# Patient Record
Sex: MALE | Race: OTHER | NOT HISPANIC OR LATINO
[De-identification: names, ages, dates, MRNs, and addresses within clinical notes are randomized per-mention and may not be internally consistent; named-entity substitution may affect disease eponyms.]

---

## 2020-01-17 ENCOUNTER — APPOINTMENT (OUTPATIENT)
Dept: ORTHOPEDIC SURGERY | Facility: CLINIC | Age: 30
End: 2020-01-17
Payer: COMMERCIAL

## 2020-01-17 VITALS — HEIGHT: 70 IN | WEIGHT: 185 LBS | BODY MASS INDEX: 26.48 KG/M2

## 2020-01-17 PROBLEM — Z00.00 ENCOUNTER FOR PREVENTIVE HEALTH EXAMINATION: Status: ACTIVE | Noted: 2020-01-17

## 2020-01-17 PROCEDURE — 99215 OFFICE O/P EST HI 40 MIN: CPT

## 2020-01-20 NOTE — ASSESSMENT
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 He has significant weakness when examining his rotator cuff.  He also has decreased sensation in his right arm.  He will be sent for MRI of his Cervical spine and his right shoulder.  He will not return to sport until his MRIs have been reviewed.   He can take anti-inflammatories as needed for pain.  He will followup once his MRIs are completed.  He knows to call with any questions or concerns or if his symptoms acutely worsen.

## 2020-01-20 NOTE — PHYSICAL EXAM
[de-identified] : General: No acute distress, conversant, well-nourished.\par Head: Normocephalic, atraumatic\par Neck: trachea midline, FROM\par Heart: normotensive and normal rate and rhythm\par Lungs: No labored breathing\par Skin: No abrasions, no rashes, no edema\par Psych: Alert and oriented to person, place and time\par Extremities: no peripheral edema or digital cyanosis\par Gait: Normal gait. Can perform tandem gait.  \par Vascular: warm and well perfused distally, palpable distal pulses\par \par Shoulder Exam:\par No swelling, no erythema.  \par Tenderness to palpation at shoulder. \par Pain with active ROM\par \par Positive Neer's impingement sign\par Positive Hawkin's test\par Pain and weakness with Sarai's test\par Pain and weakness with shoulder ER and IR.\par \par No tenderness at bicipital groove\par Negative Speed's test\par Negative Yergson's test\par \par Negative Cross-body Adduction\par Negative Hopewell's test\par \par Sensation intact to light touch axillary, medial and lateral antebrachial cutaneous, median, radial and ulnar distributions\par +motor deltoid, biceps, triceps, EPL, FDP and IO\par palpable radial pulse, WWP distally\par \par Cervical Spine: \par Alignment normal. \par Tenderness to palpation at neck.  No step-off, no deformity.\par \par NEURO:\par Sensation \par          Left           \par C5     2/2               \par C6     2/2               \par C7     2/2               \par C8     2/2              \par T1     2/2             \par \par          Right         \par C5     1/2               \par C6     1/2               \par C7     1/2               \par C8     1/2              \par T1     1/2      \par \par Motor: \par                                                Left             \par C5 (deltoid abduction)             5/5               \par C6 (biceps flexion)                   5/5                \par C7 (triceps extension)             5/5               \par C8 (finger flexion)                     5/5               \par T1 (interosseous)                     5/5           \par \par                                                Right           \par C5 (deltoid abduction)             4/5               \par C6 (biceps flexion)                   5/5                \par C7 (triceps extension)             5/5               \par C8 (finger flexion)                     5/5               \par T1 (interosseous)                     5/5                     \par \par Sensation \par Left L2  -  2/2            \par Left L3  -  2/2\par Left L4  -  2/2\par Left L5  -  2/2\par Left S1  -  2/2\par \par Right L2  -  2/2            \par Right L3  -  2/2\par Right L4  -  2/2\par Right L5  -  2/2\par Right S1  -  2/2\par \par Motor: \par Left L2 (hip flexion)                            5/5                \par Left L3 (knee extension)                   5/5                \par Left L4 (ankle dorsiflexion)                 5/5                \par Left L5 (long toe extensor)                5/5                \par Left S1 (ankle plantar flexion)           5/5\par \par Right L2 (hip flexion)                            5/5                \par Right L3 (knee extension)                   5/5                \par Right L4 (ankle dorsiflexion)                 5/5                \par Right L5 (long toe extensor)                5/5                \par Right S1 (ankle plantar flexion)           5/5\par \par Reflexes: Normal and symmetric\par Positive Spurling’s test.  Negative Cowan’s reflex.  \par Negative clonus.  Down-going Babinski. [de-identified] : Cervical radiographs obtained today shows no fracture or dislocation.  \par \par Right shoulder obtained today shows no fracture or dislocation.

## 2020-01-20 NOTE — HISTORY OF PRESENT ILLNESS
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 He has been experiencing numbness in his right upper extremity. He has neck pain and pain in his shoulder.  He has weakness lifting his arm above his head.  The paresthesias go down to his hand.  He did physical therapy which provided minimal relief.

## 2020-01-31 ENCOUNTER — APPOINTMENT (OUTPATIENT)
Dept: ORTHOPEDIC SURGERY | Facility: CLINIC | Age: 30
End: 2020-01-31
Payer: COMMERCIAL

## 2020-01-31 DIAGNOSIS — S49.91XA UNSPECIFIED INJURY OF RIGHT SHOULDER AND UPPER ARM, INITIAL ENCOUNTER: ICD-10-CM

## 2020-01-31 PROCEDURE — 99213 OFFICE O/P EST LOW 20 MIN: CPT

## 2020-02-02 NOTE — ASSESSMENT
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  His MRI show C5-C6 and C6-C7 right sided disc herniations.  He has compression of his right C7 nerve with weakness of his right triceps compared to his left triceps. Overall, his symptoms have significantly improved compared to his first appointment on Jan 17, 2020.  His radicular pain has resolved.  His sensation has improved.  His strength and function has improved.  He will continue conservative treatment. He was given a referral for physical therapy. He will remain out of contact sports.  He will followup in 6 weeks.  He knows to call with any questions or concerns or if his symptoms acutely worsen.

## 2020-02-02 NOTE — PHYSICAL EXAM
[de-identified] : General: No acute distress, conversant, well-nourished.\par Head: Normocephalic, atraumatic\par Neck: trachea midline, FROM\par Heart: normotensive and normal rate and rhythm\par Lungs: No labored breathing\par Skin: No abrasions, no rashes, no edema\par Psych: Alert and oriented to person, place and time\par Extremities: no peripheral edema or digital cyanosis\par Gait: Normal gait. Can perform tandem gait. \par Vascular: warm and well perfused distally, palpable distal pulses\par \par MSK:\par Right Shoulder Exam:\par No swelling, no erythema. \par No tenderness to palpation at shoulder. \par No pain with active ROM\par \par Negative Neer's impingement sign\par Negative Hawkin's test\par Negative Sarai's test\par Good strength with shoulder ER and IR.\par \par No tenderness at bicipital groove\par Negative Speed's test\par Negative Yergson's test\par \par Negative Cross-body Adduction\par Negative Caddo's test\par \par Sensation intact to light touch axillary, medial and lateral antebrachial cutaneous, median, radial and ulnar distributions\par +motor deltoid, biceps, triceps, EPL, FDP and IO\par palpable radial pulse, WWP distally\par \par Cervical Spine: \par Alignment normal. \par No tenderness to palpation at neck. No step-off, no deformity.\par \par NEURO:\par Sensation \par  Left \par C5 2/2 \par C6 2/2 \par C7 2/2 \par C8 2/2 \par T1 2/2 \par \par  Right \par C5 2/2 \par C6 2/2 \par C7 2/2 \par C8 2/2 \par T1 2/2 \par \par Motor: \par    Left \par C5 (deltoid abduction) 5/5 \par C6 (biceps flexion)  5/5 \par C7 (triceps extension) 5/5 \par C8 (finger flexion)  5/5 \par T1 (interosseous)  5/5 \par \par    Right \par C5 (deltoid abduction) 4/5 \par C6 (biceps flexion)  5/5 \par C7 (triceps extension) 4+/5 \par C8 (finger flexion)  5/5 \par T1 (interosseous)  5/5  \par \par Sensation \par Left L2 - 2/2 \par Left L3 - 2/2\par Left L4 - 2/2\par Left L5 - 2/2\par Left S1 - 2/2\par \par Right L2 - 2/2 \par Right L3 - 2/2\par Right L4 - 2/2\par Right L5 - 2/2\par Right S1 - 2/2\par \par Motor: \par Left L2 (hip flexion)  5/5 \par Left L3 (knee extension)  5/5 \par Left L4 (ankle dorsiflexion)  5/5 \par Left L5 (long toe extensor) 5/5 \par Left S1 (ankle plantar flexion) 5/5\par \par Right L2 (hip flexion)  5/5 \par Right L3 (knee extension)  5/5 \par Right L4 (ankle dorsiflexion)  5/5 \par Right L5 (long toe extensor) 5/5 \par Right S1 (ankle plantar flexion) 5/5\par \par Reflexes: Normal and symmetric\par Negative Spurling’s test. Negative Cowan’s reflex. \par Negative clonus. Down-going Babinski.  [de-identified] : MRI cervical spine (1/21/20): Right C5-C6 disc herniation encroaching the exiting right C6 with moderate foraminal stenosis,  Right C6-C7 disc herniation causing compression of the exiting C7 nerve roots and severe foraminal stenosis.  \par \par MRI right shoulder (1/25/20): Small nondisplaced tear of superior labrum.  Evidence of mild adhesive capsulitis.  Mild superior rotator cuff tendinosis.  MIld AC joint arthrosis.

## 2020-02-02 NOTE — HISTORY OF PRESENT ILLNESS
[de-identified] : 29 year old male returns for followup to review his recent MRIs.  Since his last visit he says his pain has completely resolved.  He said the paresthesias have also resolved. He is not taking any medications for pain.  He says his right arm still feels weaker compared to the left, especially with elbow extension. He no longer has issues with lifting his arm above his head and overall he says his strength is improving.   He denies fine motor deficits, balance problems or urinary retention.

## 2020-03-10 ENCOUNTER — APPOINTMENT (OUTPATIENT)
Dept: ORTHOPEDIC SURGERY | Facility: CLINIC | Age: 30
End: 2020-03-10
Payer: COMMERCIAL

## 2020-03-10 DIAGNOSIS — M54.12 RADICULOPATHY, CERVICAL REGION: ICD-10-CM

## 2020-03-10 PROCEDURE — 99213 OFFICE O/P EST LOW 20 MIN: CPT

## 2020-03-10 NOTE — ASSESSMENT
[FreeTextEntry1] : 29 year old male returns for followup for right cervical radiculopathy.  He has had complete resolution of symptoms.  He has full strength.  He can return to his sport as a  and resume activities as tolerated.  He knows to look for and report any reoccurrence of symptoms.  He will followup in 6 weeks.  He knows to call with any questions or concerns or if his symptoms acutely worsen.

## 2020-03-10 NOTE — PHYSICAL EXAM
[de-identified] : General: No acute distress, conversant, well-nourished.\par Head: Normocephalic, atraumatic\par Neck: trachea midline, FROM\par Heart: normotensive and normal rate and rhythm\par Lungs: No labored breathing\par Skin: No abrasions, no rashes, no edema\par Psych: Alert and oriented to person, place and time\par Extremities: no peripheral edema or digital cyanosis\par Gait: Normal gait. Can perform tandem gait.  \par Vascular: warm and well perfused distally, palpable distal pulses\par \par MSK:\par Cervical Spine: \par No tenderness to palpation.  No step-off, no deformity.\par No pain or neurologic symptoms with full active range of motion (flexion, extension, lateral bending and rotation).\par \par NEURO:\par Sensation \par          Left           \par C5     2/2               \par C6     2/2               \par C7     2/2               \par C8     2/2              \par T1     2/2             \par \par          Right         \par C5     2/2               \par C6     2/2               \par C7     2/2               \par C8     2/2              \par T1     2/2      \par \par Motor: \par                                                Left             \par C5 (deltoid abduction)             5/5               \par C6 (biceps flexion)                   5/5                \par C7 (triceps extension)             5/5               \par C8 (finger flexion)                     5/5               \par T1 (interosseous)                     5/5           \par \par                                                Right           \par C5 (deltoid abduction)             5/5               \par C6 (biceps flexion)                   5/5                \par C7 (triceps extension)             5/5               \par C8 (finger flexion)                     5/5               \par T1 (interosseous)                     5/5                     \par \par Sensation \par Left L2  -  2/2            \par Left L3  -  2/2\par Left L4  -  2/2\par Left L5  -  2/2\par Left S1  -  2/2\par \par Right L2  -  2/2            \par Right L3  -  2/2\par Right L4  -  2/2\par Right L5  -  2/2\par Right S1  -  2/2\par \par Motor: \par Left L2 (hip flexion)                            5/5                \par Left L3 (knee extension)                   5/5                \par Left L4 (ankle dorsiflexion)                 5/5                \par Left L5 (long toe extensor)                5/5                \par Left S1 (ankle plantar flexion)           5/5\par \par Right L2 (hip flexion)                            5/5                \par Right L3 (knee extension)                   5/5                \par Right L4 (ankle dorsiflexion)                 5/5                \par Right L5 (long toe extensor)                5/5                \par Right S1 (ankle plantar flexion)           5/5\par \par Reflexes: Normal and symmetric\par Negative Spurling’s test.  Negative Cowan’s reflex.  \par Negative clonus.  Down-going Babinski.

## 2020-03-10 NOTE — HISTORY OF PRESENT ILLNESS
[de-identified] : 29 year old male returns for followup.  He says he is doing great and has complete resolution of his symptoms.  He says he feels his strength is equal between his two upper extremities.  He has no pain, no paresthesias, no numbness, no fine motor deficits, no balance problems, no bowel/bladder symptoms.  He is not taking any pain medications.

## 2020-04-21 ENCOUNTER — APPOINTMENT (OUTPATIENT)
Dept: ORTHOPEDIC SURGERY | Facility: CLINIC | Age: 30
End: 2020-04-21

## 2020-12-16 ENCOUNTER — EMERGENCY (EMERGENCY)
Facility: HOSPITAL | Age: 30
LOS: 1 days | Discharge: HOME | End: 2020-12-16
Payer: COMMERCIAL

## 2020-12-16 VITALS
HEART RATE: 58 BPM | HEIGHT: 70 IN | SYSTOLIC BLOOD PRESSURE: 129 MMHG | DIASTOLIC BLOOD PRESSURE: 67 MMHG | RESPIRATION RATE: 18 BRPM | OXYGEN SATURATION: 100 % | WEIGHT: 199.96 LBS | TEMPERATURE: 98 F

## 2020-12-16 LAB — SARS-COV-2 RNA SPEC QL NAA+PROBE: SIGNIFICANT CHANGE UP

## 2020-12-16 PROCEDURE — 99284 EMERGENCY DEPT VISIT MOD MDM: CPT

## 2020-12-16 PROCEDURE — U0003: CPT

## 2020-12-16 PROCEDURE — 99283 EMERGENCY DEPT VISIT LOW MDM: CPT

## 2020-12-16 NOTE — ED PROVIDER NOTE - PHYSICAL EXAMINATION
Const: AOX3 nontoxic appearing, no apparent respiratory or physical distress. Stable gait   HEENT: NC/AT. Moist mucous membranes.  Eyes: EOMI  Neck: Soft and supple. Full ROM without pain.  Cardiac: Regular rate and regular rhythm. +S1/S2. No murmurs.   Resp: Speaking in full sentences. No evidence of respiratory distress. No wheezes, rales or rhonchi. No adventitious breath sounds   Abd: Soft, non-tender, non-distended. Normal bowel sounds in all 4 quadrants. No guarding or rebound.  Back: Spine midline and non-tender.   Skin: No rashes, abrasions or lacerations.  Neuro: Awake, alert & oriented x 3. Moves all extremities symmetrically.

## 2020-12-16 NOTE — ED PROVIDER NOTE - NS ED ROS FT
Denies fever, chills, fatigue, and weight loss. Denies HA, Dizziness.   ENMT: Denies URI symptoms, difficulty swallowing, sore throat, loss of taste or smell.   CARDIO: Denies CP, palpitations, edema.   RESP: Denies Cough, SOB, Diff breathing, hemoptysis.   GI: Denies N/V, ABD pain, change in bowel movement..   MS: Denies joint pain, back pain, weakness, decreased ROM, swelling.   NEURO: Denies change in gait, seizures, loss of sensation, dizziness, confusion LOC.   SKIN: denies rash or discoloration

## 2020-12-16 NOTE — ED PROVIDER NOTE - PROGRESS NOTE DETAILS
COVID swabs obtained.  Advised home quarantine until test results.  If test positive, requires home quarantine for 14-days.  Anticipatory guidance provided and supportive care recommended. Advised immediate return if worsening symptoms, especially if short of breath.

## 2020-12-16 NOTE — ED PROVIDER NOTE - OBJECTIVE STATEMENT
Pt presenting to the ER for COVID-19 testing. Denies fevers chills, loss of taste or smell, URI symptoms, chest pain or shortness of breath, nausea vomiting diarrhea abdominal pain, weakness or fatigue. Eating and drinking normal diet. Normal output. Pt requesting testing at this time due to positive exposure. no travel and non-smoker.

## 2020-12-16 NOTE — ED PROVIDER NOTE - CLINICAL SUMMARY MEDICAL DECISION MAKING FREE TEXT BOX
Pt is a 30y M with no PMH presenting for covid swab s/p exposure. Pt is symptomatic. Will send swab and return precautions given.

## 2020-12-16 NOTE — ED PROVIDER NOTE - PATIENT PORTAL LINK FT
You can access the FollowMyHealth Patient Portal offered by [facility] by registering at the following website: [email or web address]. By joining [employer]’s FollowMyHealth portal, you will also be able to view your health information using other applications (apps) compatible with our system.

## 2020-12-22 ENCOUNTER — EMERGENCY (EMERGENCY)
Facility: HOSPITAL | Age: 30
LOS: 1 days | Discharge: HOME | End: 2020-12-22
Payer: COMMERCIAL

## 2020-12-22 VITALS
RESPIRATION RATE: 16 BRPM | DIASTOLIC BLOOD PRESSURE: 67 MMHG | WEIGHT: 199.96 LBS | HEIGHT: 70 IN | SYSTOLIC BLOOD PRESSURE: 128 MMHG | HEART RATE: 77 BPM | OXYGEN SATURATION: 99 % | TEMPERATURE: 98 F

## 2020-12-22 PROBLEM — Z78.9 OTHER SPECIFIED HEALTH STATUS: Chronic | Status: ACTIVE | Noted: 2020-12-16

## 2020-12-22 PROCEDURE — 99282 EMERGENCY DEPT VISIT SF MDM: CPT

## 2020-12-22 PROCEDURE — 99283 EMERGENCY DEPT VISIT LOW MDM: CPT

## 2020-12-22 PROCEDURE — U0003: CPT

## 2020-12-22 PROCEDURE — 87631 RESP VIRUS 3-5 TARGETS: CPT

## 2020-12-22 NOTE — ED PROVIDER NOTE - NSFOLLOWUPINSTRUCTIONS_ED_ALL_ED_FT
You were tested for COVID19 during your visit in the emergency department. The results will take 5-7 days to come back. Do not return to work until your COVID test results as negative. Plan to self-quarantine yourself until this result is available. Avoid contact with others. Wash your hands frequently. If you develop worsening symptoms- such as respiratory distress, confusion, severe weakness, or anything new or concerning, please return to the emergency department to be evaluated.

## 2020-12-22 NOTE — ED PROVIDER NOTE - PHYSICAL EXAMINATION
Constitutional - well-developed; well nourished. Head - NCAT. Airway patent. Neuro - A&Ox3. strength 5/5 x4. sensation intact x4. normal gait. Skin - No rash. MSK - normal ROM.

## 2020-12-22 NOTE — ED PROVIDER NOTE - PATIENT PORTAL LINK FT
You can access the FollowMyHealth Patient Portal offered by [facility] by registering at the following website: [email or web address]. By joining [employer]’s FollowMyHealth portal, you will also be able to view your health information using other applications (apps) compatible with our system.

## 2021-02-10 ENCOUNTER — EMERGENCY (EMERGENCY)
Facility: HOSPITAL | Age: 31
LOS: 1 days | Discharge: HOME | End: 2021-02-10
Payer: COMMERCIAL

## 2021-02-10 ENCOUNTER — TRANSCRIPTION ENCOUNTER (OUTPATIENT)
Age: 31
End: 2021-02-10

## 2021-02-10 VITALS
HEIGHT: 70 IN | DIASTOLIC BLOOD PRESSURE: 75 MMHG | HEART RATE: 63 BPM | TEMPERATURE: 98 F | SYSTOLIC BLOOD PRESSURE: 127 MMHG | RESPIRATION RATE: 20 BRPM | WEIGHT: 199.96 LBS | OXYGEN SATURATION: 98 %

## 2021-02-10 LAB — SARS-COV-2 RNA SPEC QL NAA+PROBE: SIGNIFICANT CHANGE UP

## 2021-02-10 PROCEDURE — 99282 EMERGENCY DEPT VISIT SF MDM: CPT

## 2021-02-10 PROCEDURE — U0003: CPT

## 2021-02-10 PROCEDURE — U0005: CPT

## 2021-02-10 PROCEDURE — 99283 EMERGENCY DEPT VISIT LOW MDM: CPT

## 2021-02-10 NOTE — ED PROVIDER NOTE - PATIENT PORTAL LINK FT
You can access the FollowMyHealth Patient Portal offered by [facility] by registering at the following website: [email or web address]. By joining [employer]’s FollowMyHealth portal, you will also be able to view your health information using other applications (apps) compatible with our system.

## 2021-02-10 NOTE — ED PROVIDER NOTE - OBJECTIVE STATEMENT
COVID ASYMPTOMATIC SWAB  Pt presenting to the ER for COVID-19 testing. Denies fevers chills, loss of taste or smell, URI symptoms, chest pain or shortness of breath, nausea vomiting diarrhea abdominal pain, weakness or fatigue. Eating and drinking normal diet. Normal output. Pt requesting testing at this time. positive exposure

## 2022-05-25 ENCOUNTER — APPOINTMENT (OUTPATIENT)
Dept: ORTHOPEDIC SURGERY | Facility: CLINIC | Age: 32
End: 2022-05-25
Payer: COMMERCIAL

## 2022-05-25 DIAGNOSIS — M23.92 UNSPECIFIED INTERNAL DERANGEMENT OF LEFT KNEE: ICD-10-CM

## 2022-05-25 DIAGNOSIS — M25.562 PAIN IN LEFT KNEE: ICD-10-CM

## 2022-05-25 PROCEDURE — 73562 X-RAY EXAM OF KNEE 3: CPT | Mod: LT

## 2022-05-25 PROCEDURE — 99214 OFFICE O/P EST MOD 30 MIN: CPT

## 2022-05-25 NOTE — ASSESSMENT
[FreeTextEntry1] : Discussed at length with patient exam history and imaging and clinical concern of her LCL injury versus lateral meniscus tear.  He was concerned over ligamentous injury MRI recommended for further evaluation and patient agrees.  Activity restrictions discussed until MRI review

## 2022-05-25 NOTE — HISTORY OF PRESENT ILLNESS
[de-identified] : Patient is a new patient who has been seen several years ago presenting for new onset of left knee pain.  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 He reports mild swelling and mild soreness with ambulation

## 2022-05-25 NOTE — PHYSICAL EXAM
[de-identified] : Left knee\par \par Constitutional: \par The patient is healthy-appearing and in no apparent distress. \par \par Gait:\par The patient ambulates with a normal gait and no limp.\par \par Cardiovascular System: \par The capillary refill is less than 2 seconds. \par \par Skin: \par There are no skin abnormalities.\par \par Left Knee:\par  \par Bony Palpation: \par There is no tenderness of the medial joint line. \par There is tenderness of the lateral joint line.\par There is no tenderness of the medial femoral chondyle.\par There is no tenderness of the lateral femoral chondyle.\par There is no tenderness of the tibial tubercle.\par There is no tenderness of the superior patella.\par There is no tenderness of the inferior patella.\par There is no tenderness of the medial patellar facet.\par There is no tenderness of the lateral patellar facet.\par \par Soft Tissue Palpation: \par There is no tenderness of the medial retinaculum.\par There is tenderness of the mid to distal LCL.\par There is no tenderness of the lateral retinaculum.\par There is no tenderness of the quadriceps tendon.\par There is no tenderness of the patella tendon.\par There is no tenderness of the ITB.\par There is no tenderness of the pes anserine.\par \par Active Range of Motion: \par The range of motion at the knee actively and passively is full. \par \par Special Tests: \par There is a negative Apley.\par There is a negative Steinmanns. \par There is a negative Lachman and Anterior Drawer.\par There is a negative Posterior Drawer.  \par There is no valgus laxity.\par There is trace grade 1 valgus laxtiy with pain.\par \par Strength: \par There is 5/5 hip flexion and 5/5 knee flexion and extension.  \par \par Psychiatric: \par The patient demonstrates a normal mood and affect and is active and alert [de-identified] : Given patient's reported history and physical examination, x-ray evaluation ( as listed below ) was ordered and performed to aid in diagnosis and treatment of the patient.\par X-ray left knee.  There is no significant bony / soft tissue abnormality, arthritis, or fracture.\par

## 2022-06-01 ENCOUNTER — TRANSCRIPTION ENCOUNTER (OUTPATIENT)
Age: 32
End: 2022-06-01

## 2024-07-01 ENCOUNTER — APPOINTMENT (OUTPATIENT)
Dept: ORTHOPEDIC SURGERY | Facility: CLINIC | Age: 34
End: 2024-07-01
Payer: MEDICAID

## 2024-07-01 PROBLEM — S29.011A STRAIN OF RIGHT PECTORALIS MUSCLE: Status: ACTIVE | Noted: 2024-07-01

## 2024-07-01 PROCEDURE — 73030 X-RAY EXAM OF SHOULDER: CPT | Mod: RT

## 2024-07-01 PROCEDURE — 99213 OFFICE O/P EST LOW 20 MIN: CPT | Mod: 25

## 2024-07-03 ENCOUNTER — APPOINTMENT (OUTPATIENT)
Dept: ORTHOPEDIC SURGERY | Facility: CLINIC | Age: 34
End: 2024-07-03

## 2024-07-17 ENCOUNTER — APPOINTMENT (OUTPATIENT)
Dept: ORTHOPEDIC SURGERY | Facility: CLINIC | Age: 34
End: 2024-07-17

## 2024-07-17 DIAGNOSIS — S29.011A STRAIN OF MUSCLE AND TENDON OF FRONT WALL OF THORAX, INITIAL ENCOUNTER: ICD-10-CM

## 2024-07-23 ENCOUNTER — NON-APPOINTMENT (OUTPATIENT)
Age: 34
End: 2024-07-23

## 2024-07-23 ENCOUNTER — APPOINTMENT (OUTPATIENT)
Dept: ORTHOPEDIC SURGERY | Facility: CLINIC | Age: 34
End: 2024-07-23
Payer: MEDICAID

## 2024-07-23 VITALS — WEIGHT: 185 LBS | BODY MASS INDEX: 26.48 KG/M2 | HEIGHT: 70 IN

## 2024-07-23 PROBLEM — S29.011A RUPTURE OF PECTORALIS MAJOR MUSCLE, INITIAL ENCOUNTER: Status: ACTIVE | Noted: 2024-07-23

## 2024-07-23 PROCEDURE — 99214 OFFICE O/P EST MOD 30 MIN: CPT

## 2024-07-23 NOTE — PHYSICAL EXAM
[de-identified] : Right Shoulder: Constitutional: The patient is healthy-appearing and in no apparent distress.   Cardiovascular System:  The capillary refill is less than 2 seconds.   Skin:  There are no skin abnormalities.  Right Shoulder:  Inspection:  There is no atrophy, erythema, warmth, swelling. There is no scapular winging. There is no AC prominence.   Bony Palpation:  There is no tenderness of the clavicle. There is no tenderness of the acromioclavicular joint. There is no tenderness of the greater tuberosity.  There is no tenderness of the bicipital groove.   Soft Tissue Palpation:  There is no tenderness of the trapezius. There is no tenderness of the rhomboid. There is no tenderness of the subacromial bursa. There is tenderness at the pec insertion without palpable defect and medial retraction of the muscle  Active Range of Motion:  Forward flexion- 				180  Abduction-					150 External rotation at 0 degrees abduction-	80  Internal rotation at 0 degrees abduction-	80  Passive Range of Motion:  Forward flexion- 			180  Abduction-				150 External rotation at 0 deg abduction-	80  Internal rotation at 0 deg abduction-	80  Stability:  There is no general laxity.  There is no anterior apprehension.  Strength:  Supraspinatus abduction 		5/5 External rotation at 0 deg abduction 	5/5 Internal rotation at 0 deg abduction	5/5 Scapular elevation 			5/5   Neurological System:   There is normal sensation to light touch C5-T1.   Stability:  There is no general laxity.   Psychiatric:  The patient demonstrates a normal mood and affect and is active and alert.  [de-identified] : MRI of right shoulder ([facility]) reveals a pectoralis muscle avulsion of the tendon of both the clavicular and sternal head with retraction

## 2024-07-23 NOTE — ASSESSMENT
[FreeTextEntry1] : Discussed with patient at length symptoms and diagnosis.  Lengthy discussion with patient regarding nonoperative and operative risks and benefits as well as surgical expectations and postop protocols and expectations, including the possibility that surgery may fail to satisfactorily resolve patient's condition / symptoms.  Patient aware of the need for 6 weeks immobilization. Patient expresses understanding and patient's questions were answered.  Patient elects to proceed with surgery.

## 2024-07-23 NOTE — HISTORY OF PRESENT ILLNESS
[de-identified] : New issue: Right pectoralis  Pain:3/10 Symptoms: Aching / Sharp Prior studies: MRI @ [facility] Duration: 6/28/2024

## 2024-07-26 ENCOUNTER — APPOINTMENT (OUTPATIENT)
Age: 34
End: 2024-07-26

## 2024-07-26 PROCEDURE — 24341 RPR TDN/MUSC UPR A/E EACH: CPT | Mod: RT

## 2024-07-26 RX ORDER — OXYCODONE AND ACETAMINOPHEN 5; 325 MG/1; MG/1
5-325 TABLET ORAL
Qty: 40 | Refills: 0 | Status: ACTIVE | COMMUNITY
Start: 2024-07-26 | End: 1900-01-01

## 2024-07-30 ENCOUNTER — APPOINTMENT (OUTPATIENT)
Dept: ORTHOPEDIC SURGERY | Facility: CLINIC | Age: 34
End: 2024-07-30
Payer: MEDICAID

## 2024-07-30 DIAGNOSIS — S29.011A STRAIN OF MUSCLE AND TENDON OF FRONT WALL OF THORAX, INITIAL ENCOUNTER: ICD-10-CM

## 2024-07-30 PROCEDURE — 99024 POSTOP FOLLOW-UP VISIT: CPT

## 2024-07-30 NOTE — HISTORY OF PRESENT ILLNESS
[de-identified] : Status post right pectoralis tendon and muscle repair [de-identified] : Patient is 4 days postop states overall he is doing rather well maintaining the sling immobilizer at all times denies any paresthesias not taking any narcotics only ibuprofen intermittently for discomfort [de-identified] : On evaluation of right upper extremity postop dressing was removed wound is clean dry and intact with staples there is palpably intact pectoralis muscle and tendon with adequate overall positioning.  Normal sensation light touch C5 T1 with full elbow and wrist range of motion and strength [de-identified] : Status post right pectoralis tendon and muscle repair [de-identified] : Reviewed at length with patient surgery postop protocols with strict restrictions for the next 6 weeks except for daily elbow range of motion exercises he will follow-up in 2 weeks for staple removal he expresses understanding and restrictions if any increased pain or concern he is to contact the office

## 2024-08-08 ENCOUNTER — APPOINTMENT (OUTPATIENT)
Dept: ORTHOPEDIC SURGERY | Facility: CLINIC | Age: 34
End: 2024-08-08

## 2024-08-08 PROCEDURE — 99024 POSTOP FOLLOW-UP VISIT: CPT

## 2024-08-12 NOTE — HISTORY OF PRESENT ILLNESS
[de-identified] : Status post right pectoralis tendon and muscle repair [de-identified] : Patient is 2.5 weeks postop states overall he is doing rather well maintaining the sling immobilizer at all times denies any paresthesias not taking any medication [de-identified] : On evaluation of right upper extremity postop dressing is healed.  Staples removed.  The pectoralis muscle and tendon is intact palpably with adequate overall positioning.  Normal sensation light touch C5 T1 with full elbow and wrist range of motion and strength [de-identified] : Status post right pectoralis tendon and muscle repair [de-identified] : Reviewed at length with patient surgery postop protocols with restrictions fora total of 6 weeks.   Follow-up in 3.5 weeks

## 2024-08-12 NOTE — HISTORY OF PRESENT ILLNESS
[de-identified] : Status post right pectoralis tendon and muscle repair [de-identified] : Patient is 2.5 weeks postop states overall he is doing rather well maintaining the sling immobilizer at all times denies any paresthesias not taking any medication [de-identified] : On evaluation of right upper extremity postop dressing is healed.  Staples removed.  The pectoralis muscle and tendon is intact palpably with adequate overall positioning.  Normal sensation light touch C5 T1 with full elbow and wrist range of motion and strength [de-identified] : Status post right pectoralis tendon and muscle repair [de-identified] : Reviewed at length with patient surgery postop protocols with restrictions fora total of 6 weeks.   Follow-up in 3.5 weeks

## 2024-09-04 ENCOUNTER — APPOINTMENT (OUTPATIENT)
Dept: ORTHOPEDIC SURGERY | Facility: CLINIC | Age: 34
End: 2024-09-04
Payer: MEDICAID

## 2024-09-04 DIAGNOSIS — S29.011A STRAIN OF MUSCLE AND TENDON OF FRONT WALL OF THORAX, INITIAL ENCOUNTER: ICD-10-CM

## 2024-09-04 PROCEDURE — 99024 POSTOP FOLLOW-UP VISIT: CPT

## 2024-09-04 NOTE — HISTORY OF PRESENT ILLNESS
[de-identified] : Status post right pectoralis tendon and muscle repair [de-identified] : Patient is 6 weeks postop states overall he is doing rather well maintaining the sling immobilizer at all times denies any paresthesias not taking any medication [de-identified] : On evaluation of right upper extremity:  WOund is healed.  The pectoralis muscle and tendon is intact palpably with adequate overall positioning.  Normal sensation light touch C5 T1 with full elbow and wrist range of motion and strength [de-identified] : Status post right pectoralis tendon and muscle repair [de-identified] : Reviewed at length with patient surgery postop protocols with restrictions fora total of 6 weeks.   DC sling and begin PT next week.  Follow-up in 3 weeks

## 2024-09-25 ENCOUNTER — APPOINTMENT (OUTPATIENT)
Dept: ORTHOPEDIC SURGERY | Facility: CLINIC | Age: 34
End: 2024-09-25
Payer: MEDICAID

## 2024-09-25 DIAGNOSIS — S29.011A STRAIN OF MUSCLE AND TENDON OF FRONT WALL OF THORAX, INITIAL ENCOUNTER: ICD-10-CM

## 2024-09-25 PROCEDURE — 99024 POSTOP FOLLOW-UP VISIT: CPT

## 2024-09-25 NOTE — HISTORY OF PRESENT ILLNESS
[de-identified] : Status post right pectoralis tendon and muscle repair [de-identified] : Patient is 9 weeks postop states overall he is doing rather well maintaining the sling immobilizer at all times denies any paresthesias not taking any medication [de-identified] : On evaluation of right upper extremity:  Wound is healed.  The pectoralis muscle and tendon is intact palpably with adequate overall positioning.  Normal sensation light touch C5 T1 with full elbow and wrist range of motion and strength [de-identified] : Status post right pectoralis tendon and muscle repair [de-identified] : Reviewed at length with patient surgery postop protocols.  Patient may gently advance activity.

## 2025-04-29 ENCOUNTER — APPOINTMENT (OUTPATIENT)
Dept: ORTHOPEDIC SURGERY | Facility: CLINIC | Age: 35
End: 2025-04-29
Payer: MEDICAID

## 2025-04-29 PROCEDURE — 99213 OFFICE O/P EST LOW 20 MIN: CPT | Mod: 25

## 2025-04-29 PROCEDURE — 73060 X-RAY EXAM OF HUMERUS: CPT

## 2025-05-02 ENCOUNTER — APPOINTMENT (OUTPATIENT)
Age: 35
End: 2025-05-02

## 2025-05-02 PROCEDURE — 24342 REPAIR OF RUPTURED TENDON: CPT | Mod: LT

## 2025-05-02 RX ORDER — OXYCODONE AND ACETAMINOPHEN 5; 325 MG/1; MG/1
5-325 TABLET ORAL
Qty: 30 | Refills: 0 | Status: ACTIVE | COMMUNITY
Start: 2025-05-02 | End: 1900-01-01

## 2025-05-07 ENCOUNTER — APPOINTMENT (OUTPATIENT)
Dept: ORTHOPEDIC SURGERY | Facility: CLINIC | Age: 35
End: 2025-05-07
Payer: MEDICAID

## 2025-05-07 PROBLEM — S46.212A RUPTURE OF DISTAL BICEPS TENDON, LEFT, INITIAL ENCOUNTER: Status: ACTIVE | Noted: 2025-04-29

## 2025-05-07 PROCEDURE — 99024 POSTOP FOLLOW-UP VISIT: CPT

## 2025-05-12 ENCOUNTER — APPOINTMENT (OUTPATIENT)
Dept: ORTHOPEDIC SURGERY | Facility: CLINIC | Age: 35
End: 2025-05-12
Payer: MEDICAID

## 2025-05-12 DIAGNOSIS — S46.212A STRAIN OF MUSCLE, FASCIA AND TENDON OF OTHER PARTS OF BICEPS, LEFT ARM, INITIAL ENCOUNTER: ICD-10-CM

## 2025-05-12 PROCEDURE — 99024 POSTOP FOLLOW-UP VISIT: CPT

## 2025-05-27 ENCOUNTER — APPOINTMENT (OUTPATIENT)
Dept: ORTHOPEDIC SURGERY | Facility: CLINIC | Age: 35
End: 2025-05-27
Payer: MEDICAID

## 2025-05-27 DIAGNOSIS — S46.212A STRAIN OF MUSCLE, FASCIA AND TENDON OF OTHER PARTS OF BICEPS, LEFT ARM, INITIAL ENCOUNTER: ICD-10-CM

## 2025-05-27 PROCEDURE — 99024 POSTOP FOLLOW-UP VISIT: CPT

## 2025-06-10 ENCOUNTER — APPOINTMENT (OUTPATIENT)
Dept: ORTHOPEDIC SURGERY | Facility: CLINIC | Age: 35
End: 2025-06-10
Payer: MEDICAID

## 2025-06-10 PROCEDURE — 99024 POSTOP FOLLOW-UP VISIT: CPT
